# Patient Record
Sex: FEMALE | URBAN - METROPOLITAN AREA
[De-identification: names, ages, dates, MRNs, and addresses within clinical notes are randomized per-mention and may not be internally consistent; named-entity substitution may affect disease eponyms.]

---

## 2023-09-28 ENCOUNTER — OFFICE VISIT (OUTPATIENT)
Dept: URBAN - METROPOLITAN AREA CLINIC 12 | Facility: CLINIC | Age: 38
End: 2023-09-28
Payer: MEDICARE

## 2023-09-28 ENCOUNTER — LAB OUTSIDE AN ENCOUNTER (OUTPATIENT)
Dept: URBAN - METROPOLITAN AREA CLINIC 12 | Facility: CLINIC | Age: 38
End: 2023-09-28

## 2023-09-28 ENCOUNTER — DASHBOARD ENCOUNTERS (OUTPATIENT)
Age: 38
End: 2023-09-28

## 2023-09-28 ENCOUNTER — WEB ENCOUNTER (OUTPATIENT)
Dept: URBAN - METROPOLITAN AREA CLINIC 12 | Facility: CLINIC | Age: 38
End: 2023-09-28

## 2023-09-28 VITALS
BODY MASS INDEX: 23.79 KG/M2 | DIASTOLIC BLOOD PRESSURE: 74 MMHG | SYSTOLIC BLOOD PRESSURE: 116 MMHG | WEIGHT: 157 LBS | HEIGHT: 68 IN | HEART RATE: 85 BPM

## 2023-09-28 DIAGNOSIS — R93.5 ABNORMAL ABDOMINAL CT SCAN: ICD-10-CM

## 2023-09-28 DIAGNOSIS — R19.7 DIARRHEA: ICD-10-CM

## 2023-09-28 DIAGNOSIS — I88.0 MESENTERIC ADENITIS: ICD-10-CM

## 2023-09-28 PROCEDURE — 99204 OFFICE O/P NEW MOD 45 MIN: CPT | Performed by: INTERNAL MEDICINE

## 2023-09-28 NOTE — HPI-TODAY'S VISIT:
38 years old female presented today for follow-up after recent episode of severe diarrhea abdominal pain was diagnosed with a E. coli infection by stool studies in August 31, 2023 performed at [facility].  The patient reports a recent history of E. coli infection, which began with symptoms of extreme exhaustion, nausea, and diarrhea. She had been under significant stress due to moving and caring for her mother with pancreatic cancer, leading to poor self-care and low levels of iron and vitamin D. She experienced chills and diarrhea that persisted for several days, with no other family members falling ill. She suspects the source of the infection was contaminated ground beef.  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  She is here today to discuss the CT finding as her symptoms improved and she feels much better.  The patient has a history of a colonoscopy performed in [place] 10 yers ago  which revealed only mild irritation and no alarming findings. She also reports having an autoimmune condition and has recently undergone an MANUEL panel. The patient has noticed an increase in body allergens and skin changes. She has been tested for celiac disease and the results were negative.